# Patient Record
Sex: MALE | Race: WHITE | URBAN - METROPOLITAN AREA
[De-identification: names, ages, dates, MRNs, and addresses within clinical notes are randomized per-mention and may not be internally consistent; named-entity substitution may affect disease eponyms.]

---

## 2023-02-14 ENCOUNTER — OFFICE VISIT (OUTPATIENT)
Dept: URBAN - METROPOLITAN AREA CLINIC 98 | Facility: CLINIC | Age: 73
End: 2023-02-14
Payer: MEDICARE

## 2023-02-14 ENCOUNTER — WEB ENCOUNTER (OUTPATIENT)
Dept: URBAN - METROPOLITAN AREA CLINIC 98 | Facility: CLINIC | Age: 73
End: 2023-02-14

## 2023-02-14 VITALS
HEIGHT: 72 IN | HEART RATE: 65 BPM | SYSTOLIC BLOOD PRESSURE: 142 MMHG | TEMPERATURE: 97.2 F | BODY MASS INDEX: 29.53 KG/M2 | WEIGHT: 218 LBS | DIASTOLIC BLOOD PRESSURE: 80 MMHG

## 2023-02-14 DIAGNOSIS — K76.0 HEPATIC STEATOSIS: ICD-10-CM

## 2023-02-14 DIAGNOSIS — Z12.11 SCREEN FOR COLON CANCER: ICD-10-CM

## 2023-02-14 DIAGNOSIS — R79.89 LFT ELEVATION: ICD-10-CM

## 2023-02-14 DIAGNOSIS — R93.3 ABNORMAL CT SCAN, COLON: ICD-10-CM

## 2023-02-14 PROCEDURE — 99204 OFFICE O/P NEW MOD 45 MIN: CPT | Performed by: INTERNAL MEDICINE

## 2023-02-14 NOTE — HPI-TODAY'S VISIT:
no  diabetes. HBP gout anxiety back pain  LFT's elevated CT done shows fatty liver pt admits to EtOH use  CT also shows lobular thickening rectum, r/o neoplasm BM regular no blood in stool no recent colonosocpy

## 2023-02-27 ENCOUNTER — TELEPHONE ENCOUNTER (OUTPATIENT)
Dept: URBAN - METROPOLITAN AREA CLINIC 98 | Facility: CLINIC | Age: 73
End: 2023-02-27

## 2023-04-09 PROBLEM — 197321007: Status: ACTIVE | Noted: 2023-04-09

## 2023-04-10 ENCOUNTER — LAB OUTSIDE AN ENCOUNTER (OUTPATIENT)
Dept: URBAN - METROPOLITAN AREA CLINIC 98 | Facility: CLINIC | Age: 73
End: 2023-04-10

## 2023-04-10 ENCOUNTER — DASHBOARD ENCOUNTERS (OUTPATIENT)
Age: 73
End: 2023-04-10

## 2023-04-10 ENCOUNTER — OFFICE VISIT (OUTPATIENT)
Dept: URBAN - METROPOLITAN AREA CLINIC 98 | Facility: CLINIC | Age: 73
End: 2023-04-10
Payer: MEDICARE

## 2023-04-10 VITALS
TEMPERATURE: 97.8 F | WEIGHT: 217.8 LBS | SYSTOLIC BLOOD PRESSURE: 146 MMHG | DIASTOLIC BLOOD PRESSURE: 96 MMHG | BODY MASS INDEX: 32.26 KG/M2 | HEART RATE: 65 BPM | HEIGHT: 69 IN

## 2023-04-10 DIAGNOSIS — R93.5 ABNORMAL CT OF THE ABDOMEN: ICD-10-CM

## 2023-04-10 DIAGNOSIS — K76.0 FATTY LIVER: ICD-10-CM

## 2023-04-10 DIAGNOSIS — R74.8 ELEVATED LIVER ENZYMES: ICD-10-CM

## 2023-04-10 PROCEDURE — 99214 OFFICE O/P EST MOD 30 MIN: CPT | Performed by: INTERNAL MEDICINE

## 2023-04-10 NOTE — HPI-TODAY'S VISIT:
Here on kind referral from Dr Janna Hamilton for elevated liver tests, fatty liver ; abnormal CT abdomen  A copy of today's note will be sent to her attention.  saw Dr Craig last month  CT w fatty liver, gallstones, rectal thickening  transaminitis and elev ferritin in setting of alcohol use  HFE H63D / wt ; tbili 0.48 dbili 0.21 alt 75 ast 64 alp 44 alb 3.9 colonoscopy 2018 normal per pt report . daily alcohol use : mixed drinks 1-3 a day  he drinks bc he likes the taste willing to cut down if he knows how bad it is  . 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 c/o LBP

## 2023-04-19 LAB
A/G RATIO: 1.5
ALBUMIN: 4.1
ALKALINE PHOSPHATASE: 50
ALT (SGPT): 67
ANTI-CENTROMERE B ANTIBODIES: <0.2
ANTI-DNA (DS) AB QN: 1
ANTI-JO-1: <0.2
ANTICHROMATIN ANTIBODIES: <0.2
ANTISCLERODERMA-70 ANTIBODIES: <0.2
AST (SGOT): 63
BASO (ABSOLUTE): 0
BASOS: 1
BILIRUBIN, TOTAL: 0.6
BUN/CREATININE RATIO: 16
BUN: 13
CALCIUM: 9.2
CARBON DIOXIDE, TOTAL: 24
CHLORIDE: 103
CREATININE: 0.79
EGFR: 94
ELF(TM) SCORE: 11.59
EOS (ABSOLUTE): 0.3
EOS: 7
GGT: 335
GLOBULIN, TOTAL: 2.7
GLUCOSE: 101
HBSAG SCREEN: NEGATIVE
HCV AB: NON REACTIVE
HEMATOCRIT: 46.1
HEMATOLOGY COMMENTS:: (no result)
HEMOGLOBIN: 15.7
HEP A AB, TOTAL: POSITIVE
HEP B CORE AB, TOT: NEGATIVE
HEPATITIS B SURF AB QUANT: <3.1
IMMATURE CELLS: (no result)
IMMATURE GRANS (ABS): 0
IMMATURE GRANULOCYTES: 0
IMMUNOGLOBULIN A, QN, SERUM: 551
IMMUNOGLOBULIN G, QN, SERUM: 880
IMMUNOGLOBULIN M, QN, SERUM: 43
INTERPRETATION:: (no result)
LYMPHS (ABSOLUTE): 1
LYMPHS: 22
Lab: (no result)
MCH: 34.5
MCHC: 34.1
MCV: 101
MITOCHONDRIAL (M2) ANTIBODY: <20
MONOCYTES(ABSOLUTE): 0.7
MONOCYTES: 14
NEUTROPHILS (ABSOLUTE): 2.6
NEUTROPHILS: 56
NRBC: (no result)
PLATELETS: 146
POTASSIUM: 5
PROTEIN, TOTAL: 6.8
RBC: 4.55
RDW: 12.6
RNP ANTIBODIES: 0.3
SJOGREN'S ANTI-SS-A: <0.2
SJOGREN'S ANTI-SS-B: <0.2
SMITH ANTIBODIES: <0.2
SODIUM: 140
WBC: 4.6

## 2023-04-21 ENCOUNTER — TELEPHONE ENCOUNTER (OUTPATIENT)
Dept: URBAN - METROPOLITAN AREA CLINIC 35 | Facility: CLINIC | Age: 73
End: 2023-04-21

## 2023-05-02 ENCOUNTER — TELEPHONE ENCOUNTER (OUTPATIENT)
Dept: URBAN - METROPOLITAN AREA CLINIC 98 | Facility: CLINIC | Age: 73
End: 2023-05-02

## 2023-05-09 ENCOUNTER — TELEPHONE ENCOUNTER (OUTPATIENT)
Dept: URBAN - METROPOLITAN AREA CLINIC 98 | Facility: CLINIC | Age: 73
End: 2023-05-09

## 2023-05-09 NOTE — HPI-TODAY'S VISIT:
Interpretation:  - FibroScan result estimated to be stage F4 hepatic fibrosis*  - severe hepatic steatosis is present based on Controlled Attenuation Parameter (CAP) reading   Plan:  - Follow up with the ordering physician as planned for plans  - Results e-faxed to the referring/ordering physician(s)   -------------------------------------------------------------------------------------------------------------------  Indication: 72 y.o. male with chronic liver disease due to chronic non-alcoholic fatty liver disease for the noninvasive assessment of liver fibrosis utilizing FibroScan transient elastography.      Exam Details:  The patient was brought to the procedure room after cleaning/disinfection of the FibroScan Vibration Controlled Transient Elastography (VCTE) equipment per protocol.  The patient fasted for > 3 hours prior to the procedure.  The test was explained fully to the patient and there was an opportunity to ask questions.  The patient elected to proceed.  Patient identification was verified and entered into the Fibroscan software.  The patient was placed in the supine position with the right arm in maximum abduction to allow optimal exposure to the right lateral rib cage area.  Fibroscan was performed per protocol using the M or XL probe (see scanned report).    A series of at least ten successful 50 mHz mechanical shear wave pulses were performed.  The patient tolerated the test well and was discharged without incident.  The printed Fibroscan results were interpreted by me and signed and scanned into the electronic medical record.    Results:  - median liver stiffness of  18.4 kPa consistent with F4 stage hepatic fibrosis out of F4 (F4 is consistent with liver cirrhosis)*  - IQR/med was 9% (within expected range of <30%) with a >60% success rate  - Controlled Attenuation Parameter (CAP) was 352  dB/m which is consistent with severe hepatic steatosis  - CAP < 215 dB/m = no hepatic steatosis  - -300 = mild to moderate hepatic steatosis  - CAP > 300 dB/m = severe hepatic steatosis  - see Fibroscan result scanned into EMR (media tab) for full detailed results

## 2023-05-10 ENCOUNTER — OFFICE VISIT (OUTPATIENT)
Dept: URBAN - METROPOLITAN AREA SURGERY CENTER 18 | Facility: SURGERY CENTER | Age: 73
End: 2023-05-10
Payer: MEDICARE

## 2023-05-10 ENCOUNTER — LAB OUTSIDE AN ENCOUNTER (OUTPATIENT)
Dept: URBAN - METROPOLITAN AREA CLINIC 98 | Facility: CLINIC | Age: 73
End: 2023-05-10

## 2023-05-10 ENCOUNTER — CLAIMS CREATED FROM THE CLAIM WINDOW (OUTPATIENT)
Dept: URBAN - METROPOLITAN AREA CLINIC 4 | Facility: CLINIC | Age: 73
End: 2023-05-10
Payer: MEDICARE

## 2023-05-10 DIAGNOSIS — D12.7 ADENOMA DETERMINED BY COLORECTAL BIOPSY: ICD-10-CM

## 2023-05-10 DIAGNOSIS — K57.30 COLON, DIVERTICULOSIS: ICD-10-CM

## 2023-05-10 DIAGNOSIS — K57.90 DIVERTICULOSIS OF INTESTINE, PART UNSPECIFIED, WITHOUT PERFORATION OR ABSCESS WITHOUT BLEEDING: ICD-10-CM

## 2023-05-10 DIAGNOSIS — K52.89 MICROSCOPIC COLITIS: ICD-10-CM

## 2023-05-10 PROBLEM — 420054005: Status: ACTIVE | Noted: 2023-05-10

## 2023-05-10 PROCEDURE — G8907 PT DOC NO EVENTS ON DISCHARG: HCPCS | Performed by: INTERNAL MEDICINE

## 2023-05-10 PROCEDURE — 45385 COLONOSCOPY W/LESION REMOVAL: CPT | Performed by: INTERNAL MEDICINE

## 2023-05-10 PROCEDURE — 88305 TISSUE EXAM BY PATHOLOGIST: CPT | Performed by: PATHOLOGY

## 2023-05-10 PROCEDURE — 45380 COLONOSCOPY AND BIOPSY: CPT | Performed by: INTERNAL MEDICINE

## 2023-06-14 ENCOUNTER — OFFICE VISIT (OUTPATIENT)
Dept: URBAN - METROPOLITAN AREA SURGERY CENTER 18 | Facility: SURGERY CENTER | Age: 73
End: 2023-06-14
Payer: MEDICARE

## 2023-06-14 ENCOUNTER — CLAIMS CREATED FROM THE CLAIM WINDOW (OUTPATIENT)
Dept: URBAN - METROPOLITAN AREA CLINIC 4 | Facility: CLINIC | Age: 73
End: 2023-06-14
Payer: MEDICARE

## 2023-06-14 DIAGNOSIS — K29.70 GASTRITIS, UNSPECIFIED, WITHOUT BLEEDING: ICD-10-CM

## 2023-06-14 DIAGNOSIS — K31.89 OTHER DISEASES OF STOMACH AND DUODENUM: ICD-10-CM

## 2023-06-14 DIAGNOSIS — K74.69 CIRRHOSIS, CRYPTOGENIC: ICD-10-CM

## 2023-06-14 DIAGNOSIS — K76.6 CLINICALLY SIGNIFICANT PORTAL HYPERTENSION: ICD-10-CM

## 2023-06-14 PROCEDURE — 88341 IMHCHEM/IMCYTCHM EA ADD ANTB: CPT | Performed by: PATHOLOGY

## 2023-06-14 PROCEDURE — 88342 IMHCHEM/IMCYTCHM 1ST ANTB: CPT | Performed by: PATHOLOGY

## 2023-06-14 PROCEDURE — G8907 PT DOC NO EVENTS ON DISCHARG: HCPCS | Performed by: INTERNAL MEDICINE

## 2023-06-14 PROCEDURE — 88305 TISSUE EXAM BY PATHOLOGIST: CPT | Performed by: PATHOLOGY

## 2023-06-14 PROCEDURE — 43239 EGD BIOPSY SINGLE/MULTIPLE: CPT | Performed by: INTERNAL MEDICINE

## 2023-06-21 ENCOUNTER — TELEPHONE ENCOUNTER (OUTPATIENT)
Dept: URBAN - METROPOLITAN AREA CLINIC 98 | Facility: CLINIC | Age: 73
End: 2023-06-21

## 2023-09-21 ENCOUNTER — LAB OUTSIDE AN ENCOUNTER (OUTPATIENT)
Dept: URBAN - METROPOLITAN AREA CLINIC 98 | Facility: CLINIC | Age: 73
End: 2023-09-21

## 2025-07-10 ENCOUNTER — P2P PATIENT RECORD (OUTPATIENT)
Age: 75
End: 2025-07-10